# Patient Record
Sex: MALE | Race: WHITE | NOT HISPANIC OR LATINO | Employment: PART TIME | URBAN - METROPOLITAN AREA
[De-identification: names, ages, dates, MRNs, and addresses within clinical notes are randomized per-mention and may not be internally consistent; named-entity substitution may affect disease eponyms.]

---

## 2020-08-12 ENCOUNTER — OFFICE VISIT (OUTPATIENT)
Dept: NEUROSURGERY | Facility: CLINIC | Age: 45
End: 2020-08-12

## 2020-08-12 VITALS
SYSTOLIC BLOOD PRESSURE: 142 MMHG | DIASTOLIC BLOOD PRESSURE: 88 MMHG | HEIGHT: 65 IN | TEMPERATURE: 98.4 F | WEIGHT: 169 LBS | BODY MASS INDEX: 28.16 KG/M2

## 2020-08-12 DIAGNOSIS — M50.30 DDD (DEGENERATIVE DISC DISEASE), CERVICAL: ICD-10-CM

## 2020-08-12 DIAGNOSIS — M47.812 CERVICAL SPONDYLOSIS WITHOUT MYELOPATHY: Primary | ICD-10-CM

## 2020-08-12 PROCEDURE — 99203 OFFICE O/P NEW LOW 30 MIN: CPT | Performed by: PHYSICIAN ASSISTANT

## 2020-08-12 RX ORDER — MELOXICAM 15 MG/1
15 TABLET ORAL DAILY
COMMUNITY

## 2020-08-12 RX ORDER — CYCLOBENZAPRINE HCL 10 MG
10 TABLET ORAL 3 TIMES DAILY PRN
COMMUNITY

## 2020-08-12 NOTE — PROGRESS NOTES
Patient: Diony Mendoza  : 1975  GENDER: male    Primary Care Provider: Kamran Leung MD    Requesting Provider: As above      History    Chief Complaint: neck and left shoulder pain     History of Present Illness: Mr. Mendoza is a 45-year-old  without a significant PMHx.  He presents to our service with progressive neck and left shoulder pain that began after working in his garden in April/May of this year.  Initially he thought he pulled a muscle in his left shoulder.  However after time, anti-inflammatories, and topical Biofreeze failed to improve - he presented to his PCP for further evaluation.  CT of the cervical spine demonstrated a broad-based disc protrusion more leftward at C4-5 and C5-6.  As such patient was referred to our service for neurosurgical evaluation.      Presently Mr. Mendoza admits to posterior neck pain that extends into his left shoulder, and on occasion posteriorly towards his subscapular region.  Symptoms are intermittent, and do not appear to be positionally dependent.  Symptoms often increase with sustained activity at shoulder height or above, or with laying flat at night.  He denies right upper extremity involvement, associated sensory alteration, bilateral lower extremity evolvement, bowel/bladder dysfunction, or overt weakness.  He has no other complaints at this time.    Review of Systems   Constitutional: Negative for activity change, appetite change, chills, diaphoresis, fatigue, fever and unexpected weight change.   HENT: Negative for congestion, dental problem, drooling, ear discharge, ear pain, facial swelling, hearing loss, mouth sores, nosebleeds, postnasal drip, rhinorrhea, sinus pressure, sinus pain, sneezing, sore throat, tinnitus, trouble swallowing and voice change.    Eyes: Negative for photophobia, pain, discharge, redness, itching and visual disturbance.   Respiratory: Negative for apnea, cough, choking, chest tightness, shortness of breath, wheezing  "and stridor.    Cardiovascular: Negative for chest pain, palpitations and leg swelling.   Gastrointestinal: Negative for abdominal distention, abdominal pain, anal bleeding, blood in stool, constipation, diarrhea, nausea, rectal pain and vomiting.   Endocrine: Negative for cold intolerance, heat intolerance, polydipsia, polyphagia and polyuria.   Genitourinary: Negative for decreased urine volume, difficulty urinating, discharge, dysuria, enuresis, flank pain, frequency, genital sores, hematuria, penile pain, penile swelling, scrotal swelling, testicular pain and urgency.   Musculoskeletal: Positive for neck pain. Negative for arthralgias, back pain, gait problem, joint swelling, myalgias and neck stiffness.   Skin: Negative for color change, pallor, rash and wound.   Allergic/Immunologic: Negative for environmental allergies, food allergies and immunocompromised state.   Neurological: Negative for dizziness, tremors, seizures, syncope, facial asymmetry, speech difficulty, weakness, light-headedness, numbness and headaches.   Hematological: Negative for adenopathy. Does not bruise/bleed easily.   Psychiatric/Behavioral: Negative for agitation, behavioral problems, confusion, decreased concentration, dysphoric mood, hallucinations, self-injury, sleep disturbance and suicidal ideas. The patient is not nervous/anxious and is not hyperactive.        The patient's review of systems, past medical history, past surgical history, family history, and social history have been reviewed at length in the electronic medical record.    Physical Exam:   /88 (BP Location: Right arm, Patient Position: Sitting, Cuff Size: Adult)   Temp 98.4 °F (36.9 °C)   Ht 165.1 cm (65\")   Wt 76.7 kg (169 lb)   BMI 28.12 kg/m²   CONSTITUTIONAL:   - Patient is well-nourished  - Pleasant and appears stated age.  CV:   - Regular rate and rhythm on palpable radial pulse   - No murmur appreciated   PULMONARY:   - Speaking in full sentences  - " No use of accessory muscles   - Breathing is non-labored   - No wheezing   MUSCULOSKELETAL:  - Neck tenderness to palpation is not observed.   - ROM in neck is normal.  NEUROLOGICAL:  - A&Ox3  - Attention span, language function, and cognition are intact.  - Strength is intact in the upper and lower extremities to direct testing.  - Muscle tone is normal throughout.  - Station and gait are normal.  - Sensation is intact to light touch testing throughout.  - Deep tendon reflexes are 1+ and symmetrical.    - Tiwari's Sign is negative bilaterally.  - No clonus is elicited.  - Coordination is intact.    Body mass index is 28.12 kg/m².   Patient's Body mass index is 28.12 kg/m². BMI is within normal parameters. No follow-up required..         Medical Decision Making    Data Review:   1. CT Cervical Spine (6/29/2020): Demonstrates a large disc osteophyte leftward that narrows the recess at C4-5, as well as a large osteophyte with moderate to high-grade canal compromise at C5-6.    Diagnosis/Treatment Options:  1. Cervical spondylosis without myelopathy  2. DDD (degenerative disc disease), cervical    - Ambulatory Referral to Physical Therapy Evaluate and treat       Follow up:  Mr. Mendoza is a 45-year-old gentleman who presented to our service with neck and left shoulder pain for the past several months.  I referred him to physical therapy for the next several weeks with cervical traction.  Patient will follow-up in the office thereafter for reassessment.  If he continues to prove symptomatic I will plan for an MRI of the cervical spine without gadolinium.  Further treat recommendations to follow pending findings.    Radha Melo PA-C   8/12/2020   14:12

## 2020-09-09 ENCOUNTER — TREATMENT (OUTPATIENT)
Dept: PHYSICAL THERAPY | Facility: CLINIC | Age: 45
End: 2020-09-09

## 2020-09-09 DIAGNOSIS — M47.812 CERVICAL SPONDYLOSIS: Primary | ICD-10-CM

## 2020-09-09 PROCEDURE — 97161 PT EVAL LOW COMPLEX 20 MIN: CPT | Performed by: PHYSICAL THERAPIST

## 2020-09-09 NOTE — PROGRESS NOTES
Physical Therapy Initial Evaluation and Plan of Care      Patient: Diony Mendoza   : 1975  Diagnosis/ICD-10 Code:  Cervical spondylosis [M47.812]  Referring practitioner: Radha Melo PA-C    Subjective Evaluation    History of Present Illness  Mechanism of injury: Pt reports he felt pain in the L shoulder in may and it never got better.  Sxs continue to get worse in July he had to go to the hospital due to pain in the arm.  They found out that the nerve was causing pain.  He is now on some meds that are only helping a little.            Patient Occupation: pt is working for school system - maintence Pain  Current pain ratin  At best pain ratin  At worst pain ratin  Location: L UT and L shoulder area.  someitmes it goes to the mid humerus area.   Quality: pressure, sharp and throbbing  Alleviating factors: he is uncertain.  Exacerbated by: he is uncertain about what causes his pain the most.     Hand dominance: right             Objective        Special Questions  Patient is experiencing disturbed sleep.       Static Posture     Scapulae  Left protracted and right protracted.    Thoracic Spine  Flattened thoracic spine.    Postural Observations  Seated posture: poor  Standing posture: fair  Correction of posture: makes symptoms better    Additional Postural Observation Details  Pt with C/S mobility limited with functional activity.     Palpation   Left   Hypertonic in the cervical paraspinals and scalenes.   Tenderness of the cervical paraspinals and upper trapezius.     Neurological Testing     Sensation   Cervical/Thoracic   Left   Diminished: light touch    Reflexes   Left   Biceps (C5/C6): trace (1+)  Brachioradialis (C6): trace (1+)  Triceps (C7): normal (2+)    Right   Biceps (C5/C6): normal (2+)  Brachioradialis (C6): normal (2+)  Triceps (C7): normal (2+)    Active Range of Motion   Cervical/Thoracic Spine   Cervical    Flexion: 25 degrees with pain  Extension: 24 degrees with  pain  Left lateral flexion: 20 degrees   Right lateral flexion: 20 degrees   Left rotation: 48 degrees with pain  Right rotation: 42 degrees with pain  Left Shoulder   Flexion: 167 degrees     Passive Range of Motion     Additional Passive Range of Motion Details  PROM is better than AROM.  There is only a minimal amount of guarding.  He does have some ERP but the end feel seems to be more capsular/soft tissue tightness more than guarded.  He is restricted with PIVM as well L and R C/S.    T/S hypomobility is significant as well multi directionally.     Strength/Myotome Testing     Left Wrist/Hand      (2nd hand position)    Trial 1: 62 lbs    Trial 2: 60 lbs    Right Wrist/Hand      (2nd hand position)     Trial 1: 73 lbs    Trial 2: 71 lbs          Assessment & Plan     Assessment  Impairments: abnormal muscle firing, abnormal muscle tone, abnormal or restricted ROM, activity intolerance, impaired physical strength, lacks appropriate home exercise program and pain with function  Assessment details: Patient is a 45 year old male who comes to physical therapy with radicular sxs in the L UE for 6 months. Signs and symptoms are consistent with diagnosis.  He did seem to have improved ROM and function post mobs and exercises.  He may have a better prognosis because of this.  The patient currently has pain, decreased ROM, decreased strength, and inability to perform all essential functional activities. Pt will benefit from skilled PT services to address the above issues.     Prognosis: fair  Prognosis details:   SHORT TERM GOALS:     2 weeks  1. Pt independent with HEP  2. Pt to demonstrate cervical AROM 50-75% of expected norms to allow for improved ability to perform ADL's  3. Pt to report not having any headaches related to neck pain for the past 3 days    LONG TERM GOALS:   6 weeks  1. Pt to demonstrate cervical AROM % of expected norms to allow for improved safety when driving  2. Pt to demonstrate  ability to lift 10# OH with left arm(s) without increase in pain in the neck   3. Pt to report being able to work full shift or work in the home without increase in pain in the neck    Functional Limitations: carrying objects, lifting, sleeping, pulling, pushing, uncomfortable because of pain, reaching behind back, reaching overhead and unable to perform repetitive tasks  Plan  Therapy options: will be seen for skilled physical therapy services  Planned modality interventions: cryotherapy, electrical stimulation/Russian stimulation, thermotherapy (hydrocollator packs) and ultrasound  Planned therapy interventions: therapeutic activities, stretching, strengthening, soft tissue mobilization, postural training, motor coordination training, manual therapy, ADL retraining, body mechanics training, flexibility, functional ROM exercises, home exercise program, joint mobilization and IADL retraining  Duration in visits: 10  Treatment plan discussed with: patient  Plan details: Pt to be seen for PT for 2 x / week.         Manual Therapy:    7nc     mins  14367;  Therapeutic Exercise:    5nc     mins  06723;     Neuromuscular Diallo:        mins  87417;    Therapeutic Activity:          mins  93310;     Gait Training:           mins  95759;     Ultrasound:          mins  21210;    Electrical Stimulation:         mins  74881 ( );  Dry Needling          mins self-pay    Timed Treatment:   12nc   mins   Total Treatment:     40   mins    PT SIGNATURE: Taye Alvarez, PT   DATE TREATMENT INITIATED: 9/9/2020    Initial Certification  Certification Period: 12/8/2020  I certify that the therapy services are furnished while this patient is under my care.  The services outlined above are required by this patient, and will be reviewed every 90 days.     PHYSICIAN: Radha Melo PA-C      DATE:     Please sign and return via fax to  .. Thank you, [facility].

## 2020-09-15 ENCOUNTER — TREATMENT (OUTPATIENT)
Dept: PHYSICAL THERAPY | Facility: CLINIC | Age: 45
End: 2020-09-15

## 2020-09-15 DIAGNOSIS — M47.812 CERVICAL SPONDYLOSIS: Primary | ICD-10-CM

## 2020-09-15 PROCEDURE — 97110 THERAPEUTIC EXERCISES: CPT | Performed by: PHYSICAL THERAPIST

## 2020-09-15 PROCEDURE — 97140 MANUAL THERAPY 1/> REGIONS: CPT | Performed by: PHYSICAL THERAPIST

## 2020-09-15 PROCEDURE — 97012 MECHANICAL TRACTION THERAPY: CPT | Performed by: PHYSICAL THERAPIST

## 2020-09-15 NOTE — PROGRESS NOTES
Physical Therapy Daily Progress Note    Patient Information  Diony Mendoza  1975      Visit # : 2    Diony Mendoza reports 4/10 pain today at rest.  Pt reports last night pain was up to 8/10 in the neck and the L side of the neck and L shoulder.         Objective Pt presents to PT today with no distress noted at rest. His motion and transfers are slightly guarded in cervical and scapular region.     C/S Txn trial with limited results- he does not claim that he is having any change in sxs.     C/S mobs -- pt reports slight discomfort noted in the Left shoulder with C/S mobs to the L and to the R directions.            Post activity at rest in supine 1/10 pain noted in the neck and shoulder.     See Exercise, Manual, and Modality Logs for complete treatment.     Assessment/Plan  Pt with no elevation in pain with the mobs and exercises.        Progress per Plan of Care and Progress strengthening /stabilization /functional activity  Check response to treatment.     Visit Diagnoses:    ICD-10-CM ICD-9-CM   1. Cervical spondylosis  M47.812 721.0            Manual Therapy:    11     mins  24871;  Therapeutic Exercise:    16     mins  60383;     Neuromuscular Diallo:        mins  03121;    Therapeutic Activity:          mins  59310;     Gait Training:        ___  mins  65189;     Ultrasound:          mins  11567;    Electrical Stimulation:         mins  54715 ( );  Dry Needling          mins self-pay  Fluido  Ionto   Traction C/S   10  Timed Treatment:   27  mins   Total Treatment:     37   mins    Taye Alvarez, PT  Physical Therapist

## 2020-09-18 ENCOUNTER — TREATMENT (OUTPATIENT)
Dept: PHYSICAL THERAPY | Facility: CLINIC | Age: 45
End: 2020-09-18

## 2020-09-18 DIAGNOSIS — M47.812 CERVICAL SPONDYLOSIS: Primary | ICD-10-CM

## 2020-09-18 PROCEDURE — 97110 THERAPEUTIC EXERCISES: CPT | Performed by: PHYSICAL THERAPIST

## 2020-09-18 PROCEDURE — 97140 MANUAL THERAPY 1/> REGIONS: CPT | Performed by: PHYSICAL THERAPIST

## 2020-09-18 NOTE — PROGRESS NOTES
Physical Therapy Daily Progress Note    Patient Information  Diony Mendoza  1975      Visit # : 3    Diony Mendoza reports 5/10 pain today at rest.  Pt reports he was feeling better after last PT session.      L posterior shoulder is the primary area.     Objective Pt presents to PT today with no distress noted at rest.     Pt with T/S moderate tightness and guarding.  Post PT no elevated pain noted.     See Exercise, Manual, and Modality Logs for complete treatment.     Assessment/Plan  Pt with improved reports of pain but the number scale is not demonstrating that well.       Progress per Plan of Care and Progress strengthening /stabilization /functional activity      Visit Diagnoses:    ICD-10-CM ICD-9-CM   1. Cervical spondylosis  M47.812 721.0            Manual Therapy:    11     mins  44009;  Therapeutic Exercise:    28     mins  08871;     Neuromuscular Diallo:        mins  19459;    Therapeutic Activity:          mins  91074;     Gait Training:        ___  mins  58300;     Ultrasound:          mins  72891;    Electrical Stimulation:         mins  60343 ( );  Dry Needling          mins self-pay  Fluido  Ionto     Timed Treatment:   39   mins   Total Treatment:     39   mins    Taye Alvarez, PT  Physical Therapist

## 2020-09-22 ENCOUNTER — TREATMENT (OUTPATIENT)
Dept: PHYSICAL THERAPY | Facility: CLINIC | Age: 45
End: 2020-09-22

## 2020-09-22 DIAGNOSIS — M47.812 CERVICAL SPONDYLOSIS: Primary | ICD-10-CM

## 2020-09-22 PROCEDURE — 97140 MANUAL THERAPY 1/> REGIONS: CPT | Performed by: PHYSICAL THERAPIST

## 2020-09-22 PROCEDURE — 97110 THERAPEUTIC EXERCISES: CPT | Performed by: PHYSICAL THERAPIST

## 2020-09-22 NOTE — PROGRESS NOTES
Physical Therapy Daily Progress Note    Patient Information  Diony Mendoza  1975      Visit # : 4    Diony Mendoza reports 1-2/10 pain today at rest.  L shoulder GH area.   Pt reports soreness in the shoulder after last PT.  Pt reports acid reflux makes him have to use 2 pillows.    Pt reports he is not feeling much carry over with the exercises.          Objective Pt presents to PT today with no distress noted.     L shoulder PROM is slightly tight with IR and ER     T/S mobility is more limited than the C/S motion.     See Exercise, Manual, and Modality Logs for complete treatment.     Assessment/Plan  Pt with improved C/S mobility but he is reporting little carry over at this time.  I gave him more HEP to do at home and that may help.       Progress per Plan of Care and Progress strengthening /stabilization /functional activity      Visit Diagnoses:    ICD-10-CM ICD-9-CM   1. Cervical spondylosis  M47.812 721.0            Manual Therapy:    12     mins  80065;  Therapeutic Exercise:    42     mins  59680;     Neuromuscular Diallo:        mins  28463;    Therapeutic Activity:          mins  83910;     Gait Training:        ___  mins  72898;     Ultrasound:          mins  62989;    Electrical Stimulation:         mins  29491 ( );  Dry Needling          mins self-pay  Fluido  Ionto     Timed Treatment:   54   mins   Total Treatment:     54   mins    Taye Alvarez, PT  Physical Therapist

## 2020-09-25 ENCOUNTER — TREATMENT (OUTPATIENT)
Dept: PHYSICAL THERAPY | Facility: CLINIC | Age: 45
End: 2020-09-25

## 2020-09-25 DIAGNOSIS — M47.812 CERVICAL SPONDYLOSIS: Primary | ICD-10-CM

## 2020-09-25 PROCEDURE — 97110 THERAPEUTIC EXERCISES: CPT | Performed by: PHYSICAL THERAPIST

## 2020-09-25 PROCEDURE — 97012 MECHANICAL TRACTION THERAPY: CPT | Performed by: PHYSICAL THERAPIST

## 2020-09-25 NOTE — PROGRESS NOTES
Physical Therapy Daily Progress Note    Patient Information  Diony Mendoza  1975      Visit # : 5    Diony Mendoza reports 3/10 pain today at rest.  Pt reports that he has not done any HEP since the last visit here.      L UT area is the primary area.     Objective Pt presents to PT today with no distress at rest.     Pt still needs Cues for HEP reproduction.      Pt is not getting any relief with c/S traction trial here at PT today.     Pt with limited HEP compliance.      See Exercise, Manual, and Modality Logs for complete treatment.     Assessment/Plan  Pt with no improvements per pt reports.  He also has no been that compliant with HEP.  He did grade himself just slightly better on the NDI.       HOLD PT due to no improvements and limited HEP compliance.     Visit Diagnoses:    ICD-10-CM ICD-9-CM   1. Cervical spondylosis  M47.812 721.0            Manual Therapy:         mins  02790;  Therapeutic Exercise:    28     mins  44555;     Neuromuscular Diallo:        mins  66438;    Therapeutic Activity:          mins  97289;     Gait Training:        ___  mins  70247;     Ultrasound:          mins  79722;    Electrical Stimulation:         mins  82619 ( );  Dry Needling          mins self-pay  Fluido  Ionto   Traction  12    Timed Treatment:  28    mins   Total Treatment:     40   mins    Taye Alvarez, PT  Physical Therapist